# Patient Record
(demographics unavailable — no encounter records)

---

## 2024-10-25 NOTE — PHYSICAL EXAM
[Chaperone Declined] : Patient declined chaperone [Appropriately responsive] : appropriately responsive [Alert] : alert [No Acute Distress] : no acute distress [Soft] : soft [Non-tender] : non-tender [Non-distended] : non-distended [No HSM] : No HSM [No Mass] : no mass [Oriented x3] : oriented x3 [Examination Of The Breasts] : a normal appearance [No Discharge] : no discharge [No Masses] : no breast masses were palpable [No Lesions] : no lesions  [Labia Majora] : normal [Labia Minora] : normal [Normal] : normal [Uterine Adnexae] : normal [FreeTextEntry5] : no CMT

## 2024-10-25 NOTE — HISTORY OF PRESENT ILLNESS
[FreeTextEntry1] : 24 y/o G0 presents for annual GYN exam.  Sexually active with partner of 6 years. No issues.  Uses OCPs. Happy with method. Very light and short monthly periods.  Overall good health.  No GYN complaints.  History of HPV vaccine.  No history of cervical dysplasia.  Denies FH of ca of ovary, uterus, breast or colon.  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 Loves it.  Lives in [facility] [place] with her boyfriend.  They are thinking of getting a dog. [PapSmeardate] : 10/20/23 [TextBox_31] : ADIS